# Patient Record
Sex: MALE | Race: OTHER | Employment: OTHER | URBAN - METROPOLITAN AREA
[De-identification: names, ages, dates, MRNs, and addresses within clinical notes are randomized per-mention and may not be internally consistent; named-entity substitution may affect disease eponyms.]

---

## 2021-02-04 ENCOUNTER — OFFICE VISIT (OUTPATIENT)
Dept: ORTHOPEDIC SURGERY | Age: 46
End: 2021-02-04
Payer: COMMERCIAL

## 2021-02-04 VITALS
OXYGEN SATURATION: 95 % | HEART RATE: 71 BPM | RESPIRATION RATE: 15 BRPM | WEIGHT: 170 LBS | HEIGHT: 62 IN | BODY MASS INDEX: 31.28 KG/M2

## 2021-02-04 DIAGNOSIS — Q68.8 OS TRIGONUM SYNDROME: ICD-10-CM

## 2021-02-04 DIAGNOSIS — M25.572 CHRONIC PAIN OF LEFT ANKLE: ICD-10-CM

## 2021-02-04 DIAGNOSIS — M79.672 LEFT FOOT PAIN: Primary | ICD-10-CM

## 2021-02-04 DIAGNOSIS — G89.29 CHRONIC PAIN OF LEFT ANKLE: ICD-10-CM

## 2021-02-04 PROCEDURE — 99203 OFFICE O/P NEW LOW 30 MIN: CPT | Performed by: ORTHOPAEDIC SURGERY

## 2021-02-04 SDOH — HEALTH STABILITY: MENTAL HEALTH: HOW MANY STANDARD DRINKS CONTAINING ALCOHOL DO YOU HAVE ON A TYPICAL DAY?: NOT ASKED

## 2021-02-04 SDOH — HEALTH STABILITY: MENTAL HEALTH: HOW OFTEN DO YOU HAVE A DRINK CONTAINING ALCOHOL?: NOT ASKED

## 2021-02-04 NOTE — PATIENT INSTRUCTIONS
Continue weight-bearing as tolerated. Continue range of motion exercises as instructed. Ice and elevate as needed. Tylenol or Motrin for pain. We will schedule surgery at soonest convenience. If you have any questions regarding your surgery please call our office and ask to speak with Wendy.  [phone]

## 2021-02-04 NOTE — PROGRESS NOTES
Patient persents to the office today for left foot pain. Pt states he has been seeing Dr. Home Mcknight for his left foot. Pt states he has a difficult time bearing weight on his left foot with out pain. Pt states he has had steroid injection in the left foot and has some physical therapy which has not helped with the pain. Pt states he does wear a brace which does help with his pain and keeps his foot stable. Pt states any time he bears weight on the lateral side of his foot his ankle will give out on him. Pt states pain is a 9/10 and will increase the more he is on his feet. Pt states that pain will travel along the lateral aspect of the foot into his left ankle. Pt denies any injury or accident to the left foot.

## 2021-02-10 PROBLEM — Q68.8 OS TRIGONUM SYNDROME: Status: ACTIVE | Noted: 2021-02-10

## 2021-02-10 ASSESSMENT — ENCOUNTER SYMPTOMS
SHORTNESS OF BREATH: 0
CHEST TIGHTNESS: 0
EYE REDNESS: 0
VOMITING: 0
COLOR CHANGE: 0
EYE PAIN: 0
WHEEZING: 0

## 2021-02-10 NOTE — PROGRESS NOTES
2/4/2021   Chief Complaint   Patient presents with    Foot Injury     left foot pain        History of Present Illness:                             Kerri Hurtado is a 41 y.o. male who presents today for evaluation of his left ankle and foot pain. He has been dealing with worsening symptoms over the last year with pain most severe along the posterior lateral ankle worse with walking weightbearing and pushoff activities. He occasionally gets sensation of sharp stabbing pains at the posterior lateral aspect of his ankle and has noticed feelings of weakness or his ankle giving out because of sharp pains. He has been treated appropriately with conservative measures including bracing and steroid injections but his symptoms have continued to persist despite this. Patient persents to the office today for left foot pain. Pt states he has been seeing Dr. Rylee Lombardi for his left foot. Pt states he has a difficult time bearing weight on his left foot with out pain. Pt states he has had steroid injection in the left foot and has some physical therapy which has not helped with the pain. Pt states he does wear a brace which does help with his pain and keeps his foot stable. Pt states any time he bears weight on the lateral side of his foot his ankle will give out on him. Pt states pain is a 9/10 and will increase the more he is on his feet. Pt states that pain will travel along the lateral aspect of the foot into his left ankle. Pt denies any injury or accident to the left foot. Medical History  Patient's medications, allergies, past medical, surgical, social and family histories were reviewed and updated as appropriate. No past medical history on file. No family history on file.   Social History     Socioeconomic History    Marital status:      Spouse name: None    Number of children: None    Years of education: None    Highest education level: None   Occupational History    None   Social Needs  Financial resource strain: None    Food insecurity     Worry: None     Inability: None    Transportation needs     Medical: None     Non-medical: None   Tobacco Use    Smoking status: Never Smoker    Smokeless tobacco: Never Used   Substance and Sexual Activity    Alcohol use: Not Currently    Drug use: Never    Sexual activity: None   Lifestyle    Physical activity     Days per week: None     Minutes per session: None    Stress: None   Relationships    Social connections     Talks on phone: None     Gets together: None     Attends Taoism service: None     Active member of club or organization: None     Attends meetings of clubs or organizations: None     Relationship status: None    Intimate partner violence     Fear of current or ex partner: None     Emotionally abused: None     Physically abused: None     Forced sexual activity: None   Other Topics Concern    None   Social History Narrative    None     No current outpatient medications on file. No current facility-administered medications for this visit. No Known Allergies    Review of Systems:   Review of Systems   Constitutional: Negative for chills and fever. HENT: Negative for congestion and sneezing. Eyes: Negative for pain and redness. Respiratory: Negative for chest tightness, shortness of breath and wheezing. Cardiovascular: Negative for chest pain and palpitations. Gastrointestinal: Negative for vomiting. Musculoskeletal: Positive for arthralgias. Skin: Negative for color change and rash. Neurological: Negative for weakness and numbness. Psychiatric/Behavioral: Negative for agitation. The patient is not nervous/anxious. Examination:  General Exam:  Vitals: Pulse 71   Resp 15   Ht 5' 2\" (1.575 m)   Wt 170 lb (77.1 kg)   SpO2 95%   BMI 31.09 kg/m²    Physical Exam  Vitals signs and nursing note reviewed.    Constitutional: Appearance: Normal appearance. HENT:      Head: Normocephalic and atraumatic. Eyes:      Conjunctiva/sclera: Conjunctivae normal.      Pupils: Pupils are equal, round, and reactive to light. Neck:      Musculoskeletal: Normal range of motion. Pulmonary:      Effort: Pulmonary effort is normal.   Musculoskeletal:      Left knee: He exhibits normal range of motion, no swelling, no effusion and no ecchymosis. No tenderness found. Right ankle: He exhibits normal range of motion, no swelling, no ecchymosis, no deformity, no laceration and normal pulse. No tenderness. Achilles tendon normal.      Comments: Left lower extremity:  There is tenderness to palpation maximally along the posterior ankle joint no significant laterally at the level of the talus. Mild soft tissue swelling at the retrocalcaneal and posterior ankle areas. No tenderness to palpation at the medial or lateral malleoli. No tenderness to palpation at the anterior ankle joint. Active range of motion of the ankle is full but painful at the extremes of both flexion and extension. There is positive impingement sign during forced ankle plantarflexion with pain referred to the posterior lateral ankle. Strength is 5 out of 5 with ankle dorsiflexion and plantarflexion as well as hindfoot inversion and eversion. No instability at the ankle with stress examination. 2+ DP pulse and brisk cap refill present. Skin is intact throughout. Sensation is intact to light touch throughout. Skin:     General: Skin is warm and dry. Neurological:      Mental Status: He is alert and oriented to person, place, and time.    Psychiatric:         Mood and Affect: Mood normal.         Behavior: Behavior normal.            Diagnostic testing:  X-rays reviewed in office, I independently reviewed the films in the office today:   Narrative   3 views of the ankle show evidence of a large os trigonum at the posterior aspect of the ankle joint with mild spurring at the posterior aspect of    the tibiotalar joint adjacent to the os trigonum.  No evidence of    fracture.  No joint space narrowing or degenerative changes at the    subtalar joint.  Normal bone density.             Office Procedures:  No orders of the defined types were placed in this encounter. Assessment and Plan  1. 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 His exam and history are consistent with os trigonum syndrome and posterior ankle impingement which is related to his large accessory bone. We discussed further treatment options with ongoing injections or bracing however patient feels that he has tried these in the past and has not had any relief. Given the severity of his pain and failure of conservative treatments, I recommended we proceed with surgical excision of the os trigonum. I explained to him that the goal of this procedure would be to limit the pain and impingement at the posterior aspect of his ankle from the large accessory bone. I explained that it is likely that the area was stable initially but likely with some type of injury has created some instability of the accessory bone and now it has continued to be symptomatic. We discussed the risks and benefits of surgery. We discussed the rehabilitation course and the need to be protective of the ankle for about 4 to 6 weeks after surgery. We discussed potential risks including infection, wound healing problems, DVT, stiffness, weakness and other medical issues. He understands goals of surgery and pertinent risks. He would like to proceed with excision of the os trigonum. The patient was counseled at length about the risks of lamine Covid-19 during their perioperative period and any recovery window from their procedure. The patient was made aware that lamine Covid-19  may worsen their prognosis for recovering from their procedure  and lend to a higher morbidity and/or mortality risk. All material risks, benefits, and reasonable alternatives including postponing the procedure were discussed. The patient does wish to proceed with the procedure at this time.           Electronically signed by Lux Tadeo MD on 2/10/2021 at 1:04 PM

## 2021-02-23 DIAGNOSIS — Q68.8 OS TRIGONUM SYNDROME: Primary | ICD-10-CM

## 2021-02-26 NOTE — PROGRESS NOTES
2/26/21 - . LM concerning  surgery on 3/3/21  - have your covid-19 test performed within 10  days of your procedure, then quarantine yourself until after your procedure. Please call the PAT Nurse.

## 2021-03-03 ENCOUNTER — HOSPITAL ENCOUNTER (OUTPATIENT)
Age: 46
Setting detail: OUTPATIENT SURGERY
Discharge: HOME | End: 2021-03-03
Attending: ORTHOPAEDIC SURGERY | Admitting: ORTHOPAEDIC SURGERY
Payer: COMMERCIAL

## 2021-03-03 ENCOUNTER — APPOINTMENT (OUTPATIENT)
Dept: GENERAL RADIOLOGY | Age: 46
End: 2021-03-03
Attending: ORTHOPAEDIC SURGERY
Payer: COMMERCIAL

## 2021-03-03 ENCOUNTER — ANESTHESIA (OUTPATIENT)
Dept: OPERATING ROOM | Age: 46
End: 2021-03-03
Payer: COMMERCIAL

## 2021-03-03 ENCOUNTER — ANESTHESIA EVENT (OUTPATIENT)
Dept: OPERATING ROOM | Age: 46
End: 2021-03-03
Payer: COMMERCIAL

## 2021-03-03 VITALS
SYSTOLIC BLOOD PRESSURE: 118 MMHG | TEMPERATURE: 97.6 F | HEART RATE: 80 BPM | WEIGHT: 180 LBS | DIASTOLIC BLOOD PRESSURE: 72 MMHG | HEIGHT: 63 IN | OXYGEN SATURATION: 97 % | BODY MASS INDEX: 31.89 KG/M2 | RESPIRATION RATE: 16 BRPM

## 2021-03-03 VITALS
RESPIRATION RATE: 11 BRPM | OXYGEN SATURATION: 97 % | SYSTOLIC BLOOD PRESSURE: 118 MMHG | TEMPERATURE: 97.3 F | DIASTOLIC BLOOD PRESSURE: 66 MMHG

## 2021-03-03 DIAGNOSIS — Q68.8 OS TRIGONUM SYNDROME: Primary | ICD-10-CM

## 2021-03-03 LAB
SARS-COV-2, NAAT: NOT DETECTED
SOURCE: NORMAL

## 2021-03-03 PROCEDURE — 7100000001 HC PACU RECOVERY - ADDTL 15 MIN: Performed by: ORTHOPAEDIC SURGERY

## 2021-03-03 PROCEDURE — 3600000002 HC SURGERY LEVEL 2 BASE: Performed by: ORTHOPAEDIC SURGERY

## 2021-03-03 PROCEDURE — 6360000002 HC RX W HCPCS: Performed by: ANESTHESIOLOGY

## 2021-03-03 PROCEDURE — 6360000002 HC RX W HCPCS: Performed by: ORTHOPAEDIC SURGERY

## 2021-03-03 PROCEDURE — 7100000011 HC PHASE II RECOVERY - ADDTL 15 MIN: Performed by: ORTHOPAEDIC SURGERY

## 2021-03-03 PROCEDURE — 76000 FLUOROSCOPY <1 HR PHYS/QHP: CPT

## 2021-03-03 PROCEDURE — 2500000003 HC RX 250 WO HCPCS: Performed by: ORTHOPAEDIC SURGERY

## 2021-03-03 PROCEDURE — 7100000010 HC PHASE II RECOVERY - FIRST 15 MIN: Performed by: ORTHOPAEDIC SURGERY

## 2021-03-03 PROCEDURE — 2709999900 HC NON-CHARGEABLE SUPPLY: Performed by: ORTHOPAEDIC SURGERY

## 2021-03-03 PROCEDURE — 28120 PART REMOVAL OF ANKLE/HEEL: CPT | Performed by: ORTHOPAEDIC SURGERY

## 2021-03-03 PROCEDURE — 3700000000 HC ANESTHESIA ATTENDED CARE: Performed by: ORTHOPAEDIC SURGERY

## 2021-03-03 PROCEDURE — 2580000003 HC RX 258: Performed by: ANESTHESIOLOGY

## 2021-03-03 PROCEDURE — 6360000002 HC RX W HCPCS: Performed by: NURSE ANESTHETIST, CERTIFIED REGISTERED

## 2021-03-03 PROCEDURE — 3700000001 HC ADD 15 MINUTES (ANESTHESIA): Performed by: ORTHOPAEDIC SURGERY

## 2021-03-03 PROCEDURE — 2500000003 HC RX 250 WO HCPCS: Performed by: NURSE ANESTHETIST, CERTIFIED REGISTERED

## 2021-03-03 PROCEDURE — 7100000000 HC PACU RECOVERY - FIRST 15 MIN: Performed by: ORTHOPAEDIC SURGERY

## 2021-03-03 PROCEDURE — 87635 SARS-COV-2 COVID-19 AMP PRB: CPT

## 2021-03-03 PROCEDURE — 3600000012 HC SURGERY LEVEL 2 ADDTL 15MIN: Performed by: ORTHOPAEDIC SURGERY

## 2021-03-03 RX ORDER — DEXAMETHASONE SODIUM PHOSPHATE 4 MG/ML
INJECTION, SOLUTION INTRA-ARTICULAR; INTRALESIONAL; INTRAMUSCULAR; INTRAVENOUS; SOFT TISSUE PRN
Status: DISCONTINUED | OUTPATIENT
Start: 2021-03-03 | End: 2021-03-03 | Stop reason: SDUPTHER

## 2021-03-03 RX ORDER — HYDROCODONE BITARTRATE AND ACETAMINOPHEN 5; 325 MG/1; MG/1
1 TABLET ORAL PRN
Status: DISCONTINUED | OUTPATIENT
Start: 2021-03-03 | End: 2021-03-03 | Stop reason: HOSPADM

## 2021-03-03 RX ORDER — PROPOFOL 10 MG/ML
INJECTION, EMULSION INTRAVENOUS PRN
Status: DISCONTINUED | OUTPATIENT
Start: 2021-03-03 | End: 2021-03-03 | Stop reason: SDUPTHER

## 2021-03-03 RX ORDER — DIPHENHYDRAMINE HYDROCHLORIDE 50 MG/ML
12.5 INJECTION INTRAMUSCULAR; INTRAVENOUS
Status: DISCONTINUED | OUTPATIENT
Start: 2021-03-03 | End: 2021-03-03 | Stop reason: HOSPADM

## 2021-03-03 RX ORDER — HYDROMORPHONE HCL 110MG/55ML
0.5 PATIENT CONTROLLED ANALGESIA SYRINGE INTRAVENOUS EVERY 5 MIN PRN
Status: DISCONTINUED | OUTPATIENT
Start: 2021-03-03 | End: 2021-03-03 | Stop reason: HOSPADM

## 2021-03-03 RX ORDER — HYDROCODONE BITARTRATE AND ACETAMINOPHEN 5; 325 MG/1; MG/1
2 TABLET ORAL PRN
Status: DISCONTINUED | OUTPATIENT
Start: 2021-03-03 | End: 2021-03-03 | Stop reason: HOSPADM

## 2021-03-03 RX ORDER — PROMETHAZINE HYDROCHLORIDE 25 MG/ML
6.25 INJECTION, SOLUTION INTRAMUSCULAR; INTRAVENOUS
Status: DISCONTINUED | OUTPATIENT
Start: 2021-03-03 | End: 2021-03-03 | Stop reason: HOSPADM

## 2021-03-03 RX ORDER — HYDROCODONE BITARTRATE AND ACETAMINOPHEN 5; 325 MG/1; MG/1
1 TABLET ORAL EVERY 6 HOURS PRN
Qty: 15 TABLET | Refills: 0 | Status: SHIPPED | OUTPATIENT
Start: 2021-03-03 | End: 2021-03-08

## 2021-03-03 RX ORDER — LABETALOL HYDROCHLORIDE 5 MG/ML
5 INJECTION, SOLUTION INTRAVENOUS EVERY 10 MIN PRN
Status: DISCONTINUED | OUTPATIENT
Start: 2021-03-03 | End: 2021-03-03 | Stop reason: HOSPADM

## 2021-03-03 RX ORDER — FENTANYL CITRATE 50 UG/ML
50 INJECTION, SOLUTION INTRAMUSCULAR; INTRAVENOUS EVERY 5 MIN PRN
Status: DISCONTINUED | OUTPATIENT
Start: 2021-03-03 | End: 2021-03-03 | Stop reason: HOSPADM

## 2021-03-03 RX ORDER — HYDRALAZINE HYDROCHLORIDE 20 MG/ML
5 INJECTION INTRAMUSCULAR; INTRAVENOUS EVERY 10 MIN PRN
Status: DISCONTINUED | OUTPATIENT
Start: 2021-03-03 | End: 2021-03-03 | Stop reason: HOSPADM

## 2021-03-03 RX ORDER — FENTANYL CITRATE 50 UG/ML
INJECTION, SOLUTION INTRAMUSCULAR; INTRAVENOUS PRN
Status: DISCONTINUED | OUTPATIENT
Start: 2021-03-03 | End: 2021-03-03 | Stop reason: SDUPTHER

## 2021-03-03 RX ORDER — MEPERIDINE HYDROCHLORIDE 25 MG/ML
12.5 INJECTION INTRAMUSCULAR; INTRAVENOUS; SUBCUTANEOUS EVERY 5 MIN PRN
Status: DISCONTINUED | OUTPATIENT
Start: 2021-03-03 | End: 2021-03-03 | Stop reason: HOSPADM

## 2021-03-03 RX ORDER — SODIUM CHLORIDE, SODIUM LACTATE, POTASSIUM CHLORIDE, CALCIUM CHLORIDE 600; 310; 30; 20 MG/100ML; MG/100ML; MG/100ML; MG/100ML
INJECTION, SOLUTION INTRAVENOUS CONTINUOUS
Status: DISCONTINUED | OUTPATIENT
Start: 2021-03-03 | End: 2021-03-03 | Stop reason: HOSPADM

## 2021-03-03 RX ORDER — MIDAZOLAM HYDROCHLORIDE 1 MG/ML
INJECTION INTRAMUSCULAR; INTRAVENOUS PRN
Status: DISCONTINUED | OUTPATIENT
Start: 2021-03-03 | End: 2021-03-03 | Stop reason: SDUPTHER

## 2021-03-03 RX ORDER — ONDANSETRON 2 MG/ML
INJECTION INTRAMUSCULAR; INTRAVENOUS PRN
Status: DISCONTINUED | OUTPATIENT
Start: 2021-03-03 | End: 2021-03-03 | Stop reason: SDUPTHER

## 2021-03-03 RX ORDER — ROCURONIUM BROMIDE 10 MG/ML
INJECTION, SOLUTION INTRAVENOUS PRN
Status: DISCONTINUED | OUTPATIENT
Start: 2021-03-03 | End: 2021-03-03 | Stop reason: SDUPTHER

## 2021-03-03 RX ORDER — LIDOCAINE HYDROCHLORIDE 20 MG/ML
INJECTION, SOLUTION INFILTRATION; PERINEURAL PRN
Status: DISCONTINUED | OUTPATIENT
Start: 2021-03-03 | End: 2021-03-03 | Stop reason: SDUPTHER

## 2021-03-03 RX ORDER — BUPIVACAINE HYDROCHLORIDE 5 MG/ML
INJECTION, SOLUTION EPIDURAL; INTRACAUDAL
Status: COMPLETED | OUTPATIENT
Start: 2021-03-03 | End: 2021-03-03

## 2021-03-03 RX ADMIN — ROCURONIUM BROMIDE 50 MG: 10 INJECTION INTRAVENOUS at 09:35

## 2021-03-03 RX ADMIN — CEFAZOLIN SODIUM 2 G: 10 INJECTION, POWDER, FOR SOLUTION INTRAVENOUS at 09:44

## 2021-03-03 RX ADMIN — FENTANYL CITRATE 50 MCG: 50 INJECTION INTRAMUSCULAR; INTRAVENOUS at 09:54

## 2021-03-03 RX ADMIN — PROPOFOL 40 MG: 10 INJECTION, EMULSION INTRAVENOUS at 09:54

## 2021-03-03 RX ADMIN — LIDOCAINE HYDROCHLORIDE 100 MG: 20 INJECTION, SOLUTION INFILTRATION; PERINEURAL at 09:33

## 2021-03-03 RX ADMIN — PROPOFOL 160 MG: 10 INJECTION, EMULSION INTRAVENOUS at 09:33

## 2021-03-03 RX ADMIN — MIDAZOLAM 2 MG: 1 INJECTION INTRAMUSCULAR; INTRAVENOUS at 09:27

## 2021-03-03 RX ADMIN — ONDANSETRON 4 MG: 2 INJECTION INTRAMUSCULAR; INTRAVENOUS at 09:46

## 2021-03-03 RX ADMIN — DEXAMETHASONE SODIUM PHOSPHATE 8 MG: 4 INJECTION, SOLUTION INTRAMUSCULAR; INTRAVENOUS at 09:46

## 2021-03-03 RX ADMIN — SUGAMMADEX 50 MG: 100 INJECTION, SOLUTION INTRAVENOUS at 10:10

## 2021-03-03 RX ADMIN — SUGAMMADEX 50 MG: 100 INJECTION, SOLUTION INTRAVENOUS at 10:06

## 2021-03-03 RX ADMIN — SUGAMMADEX 50 MG: 100 INJECTION, SOLUTION INTRAVENOUS at 10:07

## 2021-03-03 RX ADMIN — SODIUM CHLORIDE, POTASSIUM CHLORIDE, SODIUM LACTATE AND CALCIUM CHLORIDE: 600; 310; 30; 20 INJECTION, SOLUTION INTRAVENOUS at 08:04

## 2021-03-03 RX ADMIN — FENTANYL CITRATE 50 MCG: 50 INJECTION, SOLUTION INTRAMUSCULAR; INTRAVENOUS at 10:40

## 2021-03-03 RX ADMIN — SUGAMMADEX 50 MG: 100 INJECTION, SOLUTION INTRAVENOUS at 10:11

## 2021-03-03 RX ADMIN — FENTANYL CITRATE 50 MCG: 50 INJECTION INTRAMUSCULAR; INTRAVENOUS at 09:32

## 2021-03-03 ASSESSMENT — PAIN SCALES - GENERAL
PAINLEVEL_OUTOF10: 5
PAINLEVEL_OUTOF10: 7
PAINLEVEL_OUTOF10: 4
PAINLEVEL_OUTOF10: 7
PAINLEVEL_OUTOF10: 5

## 2021-03-03 ASSESSMENT — PAIN DESCRIPTION - DESCRIPTORS
DESCRIPTORS: ACHING
DESCRIPTORS: SHARP

## 2021-03-03 ASSESSMENT — PAIN DESCRIPTION - ORIENTATION
ORIENTATION: LEFT

## 2021-03-03 ASSESSMENT — PULMONARY FUNCTION TESTS
PIF_VALUE: 24
PIF_VALUE: 1
PIF_VALUE: 0
PIF_VALUE: 23
PIF_VALUE: 24
PIF_VALUE: 18
PIF_VALUE: 23
PIF_VALUE: 23
PIF_VALUE: 24
PIF_VALUE: 1
PIF_VALUE: 24
PIF_VALUE: 23
PIF_VALUE: 19
PIF_VALUE: 23
PIF_VALUE: 15
PIF_VALUE: 23
PIF_VALUE: 1
PIF_VALUE: 0
PIF_VALUE: 23
PIF_VALUE: 23
PIF_VALUE: 24
PIF_VALUE: 23
PIF_VALUE: 23
PIF_VALUE: 12
PIF_VALUE: 24
PIF_VALUE: 23
PIF_VALUE: 24
PIF_VALUE: 23
PIF_VALUE: 0

## 2021-03-03 ASSESSMENT — PAIN DESCRIPTION - LOCATION
LOCATION: ANKLE

## 2021-03-03 ASSESSMENT — PAIN DESCRIPTION - ONSET: ONSET: ON-GOING

## 2021-03-03 ASSESSMENT — PAIN DESCRIPTION - PROGRESSION: CLINICAL_PROGRESSION: GRADUALLY IMPROVING

## 2021-03-03 ASSESSMENT — PAIN DESCRIPTION - FREQUENCY
FREQUENCY: CONTINUOUS

## 2021-03-03 ASSESSMENT — PAIN DESCRIPTION - PAIN TYPE: TYPE: SURGICAL PAIN

## 2021-03-03 NOTE — FLOWSHEET NOTE
Returned to room 13. Report received from [facility]. ALert and oriented. Respirations even and unlabored. Color pink. Left ankle in boot. Dressing intact. Toes on right foot are pink/ warm and able to be wiggled. Beverage provided. Call light in reach.

## 2021-03-03 NOTE — ANESTHESIA PRE PROCEDURE
Department of Anesthesiology  Preprocedure Note       Name:  Brenda Ga   Age:  41 y.o.  :  1975                                          MRN:  [id number]         Date:  3/3/2021      Surgeon: Kulwant Ochoa):  Fam Samaniego MD    Procedure: Procedure(s):  LEFT ANKLE OS TRIGONUM EXCISION    Medications prior to admission:   Prior to Admission medications    Not on File       Current medications:    Current Facility-Administered Medications   Medication Dose Route Frequency Provider Last Rate Last Admin    lactated ringers infusion   Intravenous Continuous Estanislado Pies, DO        meperidine (DEMEROL) injection 12.5 mg  12.5 mg Intravenous Q5 Min PRN Estanislado Pies, DO        HYDROmorphone (DILAUDID) injection 0.5 mg  0.5 mg Intravenous Q5 Min PRN Estanislado Pies, DO        fentaNYL (SUBLIMAZE) injection 50 mcg  50 mcg Intravenous Q5 Min PRN Estanislado Pies, DO        HYDROcodone-acetaminophen (NORCO) 5-325 MG per tablet 1 tablet  1 tablet Oral PRN Estanislado Pies, DO        Or    HYDROcodone-acetaminophen (NORCO) 5-325 MG per tablet 2 tablet  2 tablet Oral PRN Estanislado Pies, DO        promethazine (PHENERGAN) injection 6.25 mg  6.25 mg Intravenous Once PRN Estanislado Pies, DO        diphenhydrAMINE (BENADRYL) injection 12.5 mg  12.5 mg Intravenous Once PRN Estanislado Pies, DO        labetalol (NORMODYNE;TRANDATE) injection 5 mg  5 mg Intravenous Q10 Min PRN Estanislado Pies, DO        hydrALAZINE (APRESOLINE) injection 5 mg  5 mg Intravenous Q10 Min PRN Estanislado Pies, DO           Allergies:  No Known Allergies    Problem List:    Patient Active Problem List   Diagnosis Code    Os trigonum syndrome Q68.8       Past Medical History:  No past medical history on file. Past Surgical History:  No past surgical history on file.     Social History:    Social History     Tobacco Use    Smoking status: Never Smoker    Smokeless tobacco: Never Used   Substance Use Topics  Alcohol use: Not Currently                                Counseling given: Not Answered      Vital Signs (Current):   Vitals:    03/03/21 0726   BP: 115/65   Pulse: 68   Resp: 17   Temp: 99.3 °F (37.4 °C)   TempSrc: Temporal   SpO2: 97%   Weight: 180 lb (81.6 kg)   Height: 5' 3\" (1.6 m)                                              BP Readings from Last 3 Encounters:   03/03/21 115/65       NPO Status:                                                                                 BMI:   Wt Readings from Last 3 Encounters:   03/03/21 180 lb (81.6 kg)   02/04/21 170 lb (77.1 kg)     Body mass index is 31.89 kg/m². CBC: No results found for: WBC, RBC, HGB, HCT, MCV, RDW, PLT    CMP: No results found for: NA, K, CL, CO2, BUN, CREATININE, GFRAA, AGRATIO, LABGLOM, GLUCOSE, PROT, CALCIUM, BILITOT, ALKPHOS, AST, ALT    POC Tests: No results for input(s): POCGLU, POCNA, POCK, POCCL, POCBUN, POCHEMO, POCHCT in the last 72 hours.     Coags: No results found for: PROTIME, INR, APTT    HCG (If Applicable): No results found for: PREGTESTUR, PREGSERUM, HCG, HCGQUANT     ABGs: No results found for: PHART, PO2ART, GGN8AAK, GDA0LTA, BEART, X5LSGBTC     Type & Screen (If Applicable):  No results found for: LABABO, LABRH    Drug/Infectious Status (If Applicable):  No results found for: HIV, HEPCAB    COVID-19 Screening (If Applicable): No results found for: COVID19      Anesthesia Evaluation  Patient summary reviewed no history of anesthetic complications:   Airway: Mallampati: II  TM distance: >3 FB   Neck ROM: full  Mouth opening: > = 3 FB Dental: normal exam         Pulmonary:Negative Pulmonary ROS breath sounds clear to auscultation                             Cardiovascular:Negative CV ROS  Exercise tolerance: good (>4 METS),           Rhythm: regular  Rate: normal           Beta Blocker:  Not on Beta Blocker         Neuro/Psych:   Negative Neuro/Psych ROS              GI/Hepatic/Renal: Neg GI/Hepatic/Renal ROS Endo/Other: Negative Endo/Other ROS                    Abdominal:           Vascular: negative vascular ROS. Anesthesia Plan      general     ASA 1       Induction: intravenous. MIPS: Postoperative opioids intended and Prophylactic antiemetics administered. Anesthetic plan and risks discussed with patient. Plan discussed with CRNA.                   Teetee Guzman DO   3/3/2021

## 2021-03-03 NOTE — OP NOTE
Operative Note      Patient: Leif Lopez  YOB: 1975  MRN: [id number]    Date of Procedure: 3/3/2021    Pre-Op Diagnosis: LEFT ANKLE OS TRIGONUM SYNDROME, POSTERIOR ANKLE IMPINGEMENT, LEFT    Post-Op Diagnosis: Same       Procedure(s):  LEFT ANKLE OS TRIGONUM EXCISION    Surgeon(s):  Navid Berkowitz MD    Assistant:   * No surgical staff found *    Anesthesia: General    Estimated Blood Loss (mL): Minimal    Complications: None    Specimens:   * No specimens in log *    Implants:  * No implants in log *      Drains: * No LDAs found *    Findings:     Detailed Description of Procedure: The patient was identified in the preoperative area and the site was marked. He was taken back to the operating room and after induction of general endotracheal anesthesia, he was placed prone on the operating table. A thigh tourniquet was placed. His left lower extremity was prepped and draped in sterile fashion. Timeout was performed and preoperative antibiotics were given. The tourniquet was inflated to 300 mmHg and deflated at the conclusion of the case after less than 30 minutes of tourniquet time. An incision was made over the posterior lateral aspect of the ankle. Dissection was carried down to subcutaneous tissues and bleeding was controlled with the Bovie. Care was taken to retract and protect branches of the sural nerve. The interval between the Achilles tendon and peroneal tendons were developed and the fascia was incised at the posterior aspect of the ankle. There was evidence of a prominent large os trigonum present at the posterior aspect of the ankle that was impinging during forced plantarflexion of the ankle. X-rays were taken confirming the position and presence of the os trigonum. 15 blade and Success were used to release soft tissue attachments from the os trigonum. The os trigonum was excised with a rongeur and totality.  It was passed off to the back table and measured 20 mm in length, 15 mm in width, and 10 mm in depth. Following removal of the os trigonum there was good passive range of motion ankle no further impingement of the posterior aspect of the ankle joint during plantarflexion. Deep fascial layer was closed over the posterior ankle joint with interrupted 2-0 Vicryl. Deep subcutaneous layers were closed with buried 2-0 Vicryl and skin was closed with erupted 3-0 nylon. Tourniquet was deflated and bleeding was well controlled. The surgical site was injected with 10 mL of half percent plain Marcaine for postoperative pain relief. A sterile dressing was applied with Xeroform, 4 x 8's, sterile web roll, and an Ace wrap. He was then placed to a walking boot. He was extubated and taken to PACU in stable condition. All sponge and needle counts were correct. Postoperative plan:  Patient be allowed to perform weightbearing activities as tolerated in the boot. He can remove the boot at rest for range of motion activities and comfort as needed.     Follow-up in the office in 2 weeks for suture removal.    Electronically signed by Navid Berkowitz MD on 3/3/2021 at 10:28 AM

## 2021-03-03 NOTE — H&P
Chief Complaint   Patient presents with    Foot Injury       left foot pain         History of Present Illness:                             Hedy Garza is a 41 y.o. male who presents today for evaluation of his left ankle and foot pain. He has been dealing with worsening symptoms over the last year with pain most severe along the posterior lateral ankle worse with walking weightbearing and pushoff activities. He occasionally gets sensation of sharp stabbing pains at the posterior lateral aspect of his ankle and has noticed feelings of weakness or his ankle giving out because of sharp pains. He has been treated appropriately with conservative measures including bracing and steroid injections but his symptoms have continued to persist despite this.        Patient persents to the office today for left foot pain. Pt states he has been seeing Dr. Home Mcknight for his left foot. Pt states he has a difficult time bearing weight on his left foot with out pain. Pt states he has had steroid injection in the left foot and has some physical therapy which has not helped with the pain. Pt states he does wear a brace which does help with his pain and keeps his foot stable. Pt states any time he bears weight on the lateral side of his foot his ankle will give out on him. Pt states pain is a 9/10 and will increase the more he is on his feet. Pt states that pain will travel along the lateral aspect of the foot into his left ankle. Pt denies any injury or accident to the left foot.         Medical History  Patient's medications, allergies, past medical, surgical, social and family histories were reviewed and updated as appropriate.     Past Medical History   No past medical history on file. Family History   No family history on file.      Social History   Social History            Socioeconomic History    Marital status:        Spouse name: None    Number of children: None    Years of education: None    Highest education level: None   Occupational History    None   Social Needs    Financial resource strain: None    Food insecurity       Worry: None       Inability: None    Transportation needs       Medical: None       Non-medical: None   Tobacco Use    Smoking status: Never Smoker    Smokeless tobacco: Never Used   Substance and Sexual Activity    Alcohol use: Not Currently    Drug use: Never    Sexual activity: None   Lifestyle    Physical activity       Days per week: None       Minutes per session: None    Stress: None   Relationships    Social connections       Talks on phone: None       Gets together: None       Attends [employer] service: None       Active member of club or organization: None       Attends meetings of clubs or organizations: None       Relationship status: None    Intimate partner violence       Fear of current or ex partner: None       Emotionally abused: None       Physically abused: None       Forced sexual activity: None   Other Topics Concern    None   Social History Narrative    None         Current Facility-Administered Medications   No current outpatient medications on file.      No current facility-administered medications for this visit.          No Known Allergies     Review of Systems:   Review of Systems   Constitutional: Negative for chills and fever. HENT: Negative for congestion and sneezing. Eyes: Negative for pain and redness. Respiratory: Negative for chest tightness, shortness of breath and wheezing. Cardiovascular: Negative for chest pain and palpitations. Gastrointestinal: Negative for vomiting. Musculoskeletal: Positive for arthralgias. Skin: Negative for color change and rash. Neurological: Negative for weakness and numbness. Psychiatric/Behavioral: Negative for agitation.  The patient is not nervous/anxious.                                                  Examination:  General Exam:  Vitals: Pulse 71   Resp 15   Ht 5' 2\" (1.575 m)   Wt 170 lb (77.1 kg)   SpO2 95%   BMI 31.09 kg/m²    Physical Exam  Vitals signs and nursing note reviewed. Constitutional:       Appearance: Normal appearance. HENT:      Head: Normocephalic and atraumatic. Eyes:      Conjunctiva/sclera: Conjunctivae normal.      Pupils: Pupils are equal, round, and reactive to light. Neck:      Musculoskeletal: Normal range of motion. Pulmonary:      Effort: Pulmonary effort is normal.   Musculoskeletal:      Left knee: He exhibits normal range of motion, no swelling, no effusion and no ecchymosis. No tenderness found. Right ankle: He exhibits normal range of motion, no swelling, no ecchymosis, no deformity, no laceration and normal pulse. No tenderness. Achilles tendon normal.      Comments: Left lower extremity:  There is tenderness to palpation maximally along the posterior ankle joint no significant laterally at the level of the talus. Mild soft tissue swelling at the retrocalcaneal and posterior ankle areas. No tenderness to palpation at the medial or lateral malleoli. No tenderness to palpation at the anterior ankle joint. Active range of motion of the ankle is full but painful at the extremes of both flexion and extension. There is positive impingement sign during forced ankle plantarflexion with pain referred to the posterior lateral ankle. Strength is 5 out of 5 with ankle dorsiflexion and plantarflexion as well as hindfoot inversion and eversion. No instability at the ankle with stress examination. 2+ DP pulse and brisk cap refill present. Skin is intact throughout. Sensation is intact to light touch throughout. Skin:     General: Skin is warm and dry. Neurological:      Mental Status: He is alert and oriented to person, place, and time.    Psychiatric:         Mood and Affect: Mood normal.         Behavior: Behavior normal.               Diagnostic testing:  X-rays reviewed in office, I independently reviewed the films in the office today:   Narrative   3 views of the ankle show evidence of a large os trigonum at the posterior    aspect of the ankle joint with mild spurring at the posterior aspect of    the tibiotalar joint adjacent to the os trigonum.  No evidence of    fracture.  No joint space narrowing or degenerative changes at the    subtalar joint.  Normal bone density.                Office Procedures:  No orders of the defined types were placed in this encounter.        Assessment and Plan  1. 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 His exam and history are consistent with os trigonum syndrome and posterior ankle impingement which is related to his large accessory bone. We discussed further treatment options with ongoing injections or bracing however patient feels that he has tried these in the past and has not had any relief.     Given the severity of his pain and failure of conservative treatments, I recommended we proceed with surgical excision of the os trigonum. I explained to him that the goal of this procedure would be to limit the pain and impingement at the posterior aspect of his ankle from the large accessory bone. I explained that it is likely that the area was stable initially but likely with some type of injury has created some instability of the accessory bone and now it has continued to be symptomatic.     We discussed the risks and benefits of surgery. We discussed the rehabilitation course and the need to be protective of the ankle for about 4 to 6 weeks after surgery. We discussed potential risks including infection, wound healing problems, DVT, stiffness, weakness and other medical issues.     He understands goals of surgery and pertinent risks.   He would like to proceed with excision of the os trigonum.     The patient was counseled at length about the risks of lamine Covid-19 during their perioperative period and any recovery window from their procedure.  The patient was made aware that lamine Covid-19  may worsen their prognosis for recovering from their procedure  and lend to a higher morbidity and/or mortality risk.  All material risks, benefits, and reasonable alternatives including postponing the procedure were discussed. The patient does wish to proceed with the procedure at this time. History and Physical exam note from the office visit is copied above from epic. I have reviewed the note and examined the patient and find no relevant changes.      I have reviewed with the patient and/or family the risks, benefits, and alternatives to the procedure as well as expected postoperative course    Stacey Samaniego MD

## 2021-03-16 ENCOUNTER — OFFICE VISIT (OUTPATIENT)
Dept: ORTHOPEDIC SURGERY | Age: 46
End: 2021-03-16

## 2021-03-16 VITALS
HEART RATE: 69 BPM | HEIGHT: 63 IN | OXYGEN SATURATION: 99 % | RESPIRATION RATE: 15 BRPM | WEIGHT: 180 LBS | BODY MASS INDEX: 31.89 KG/M2

## 2021-03-16 DIAGNOSIS — Z09 S/P ORTHOPEDIC SURGERY, FOLLOW-UP EXAM: ICD-10-CM

## 2021-03-16 DIAGNOSIS — Z09 POSTOP CHECK: Primary | ICD-10-CM

## 2021-03-16 PROCEDURE — 99024 POSTOP FOLLOW-UP VISIT: CPT | Performed by: ORTHOPAEDIC SURGERY

## 2021-03-16 NOTE — PROGRESS NOTES
Patient returns to the office today for s/p left ankle OS trigonum excison DOS 3/3/21 2 weeks. Pt states pain today is a 0/10. Pt states his ankle does feel weak when he is walking in his walking boot but has no pain. Pt states he is no longer taking the pain medication. Pt states the surgery did helped with his pain in the left ankle.

## 2021-03-16 NOTE — PATIENT INSTRUCTIONS
Continue weight-bearing as tolerated in brace  Continue range of motion exercises as instructed. Ice and elevate as needed. Tylenol or Motrin for pain.   Stiches removed today  Keep incision dry and clean at all times  Follow up in 4 weeks   Fitted for ankle brace today

## 2021-03-17 NOTE — PROGRESS NOTES
3/16/2021   Chief Complaint   Patient presents with    Post-Op Check     s/p left ankle OS trigonum excison DOS 3/3/21        History of Present Illness:                             Itzel Donis is a 41 y.o. male who returns today for follow-up of his left ankle surgery. He has done well in his boot. He has no pain today. He feels that his ankle is healing well. Patient returns to the office today for s/p left ankle OS trigonum excison DOS 3/3/21 2 weeks. Pt states pain today is a 0/10. Pt states his ankle does feel weak when he is walking in his walking boot but has no pain. Pt states he is no longer taking the pain medication. Pt states the surgery did helped with his pain in the left ankle. Medical History  Patient's medications, allergies, past medical, surgical, social and family histories were reviewed and updated as appropriate. Review of Systems:   Review of Systems                                            Examination:  General Exam:  Vitals: Pulse 69   Resp 15   Ht 5' 3\" (1.6 m)   Wt 180 lb (81.6 kg)   SpO2 99%   BMI 31.89 kg/m²    Physical Exam   Left lower extremity:  Well-healed surgical scar over the posterior lateral aspect of the ankle. Sutures were removed. No erythema, drainage, or induration. Mild expected tenderness and minimal swelling present at the surgical site. There is smooth painless active range of motion with ankle dorsiflexion plantarflexion which is limited due to recent immobilization. Sensation and motor functions intact throughout the foot. Assessment and Plan  1. Left ankle excision os trigonum    I reviewed the findings of surgery with the patient explained that there is a large accessory bone that was removed in its entirety. I have recommended that we gradually advance his activities and begin stretching and weightbearing and gentle strengthening. I have provided him with an ankle brace that he can wean out of the boot as pain allows.   I

## 2021-04-13 ENCOUNTER — OFFICE VISIT (OUTPATIENT)
Dept: ORTHOPEDIC SURGERY | Age: 46
End: 2021-04-13

## 2021-04-13 VITALS
RESPIRATION RATE: 16 BRPM | BODY MASS INDEX: 31.89 KG/M2 | WEIGHT: 180 LBS | HEART RATE: 78 BPM | OXYGEN SATURATION: 98 % | HEIGHT: 63 IN

## 2021-04-13 DIAGNOSIS — Z09 S/P ORTHOPEDIC SURGERY, FOLLOW-UP EXAM: Primary | ICD-10-CM

## 2021-04-13 DIAGNOSIS — Z09 POSTOP CHECK: ICD-10-CM

## 2021-04-13 PROCEDURE — 99024 POSTOP FOLLOW-UP VISIT: CPT | Performed by: ORTHOPAEDIC SURGERY

## 2021-04-13 NOTE — PATIENT INSTRUCTIONS
Weightbearing and activities as tolerated  Work on ROM and strengthening exercises of the ankle   Remain in ankle brace for stability and support of the ankle  Rest, ice, and elevate as needed  May take Ibuprofen or Motrin as needed  Follow up in one month  Patient Education        Ankle: Exercises  Introduction  Here are some examples of exercises for you to try. The exercises may be suggested for a condition or for rehabilitation. Start each exercise slowly. Ease off the exercises if you start to have pain. You will be told when to start these exercises and which ones will work best for you. How to do the exercises  'Alphabet' exercise   1. Trace the alphabet with your toe. This helps your ankle move in all directions. Side-to-side knee swing exercise   1. Sit in a chair with your foot flat on the floor. 2. Slowly move your knee from side to side while keeping your foot pressed flat. 3. Continue this exercise for 2 to 3 minutes. Towel curl   1. While sitting, place your foot on a towel on the floor and scrunch the towel toward you with your toes. 2. Then use your toes to push the towel away from you. 3. Make this exercise more challenging by placing a weighted object, such as a soup can, on the other end of the towel. Towel stretch   1. Sit with your legs extended and knees straight. 2. Place a towel around your foot just under the toes. 3. Hold each end of the towel in each hand, with your hands above your knees. 4. Pull back with the towel so that your foot stretches toward you. 5. Hold the position for at least 15 to 30 seconds. 6. Repeat 2 to 4 times a session, up to 5 sessions a day. Ankle eversion exercise   1. Start by sitting with your foot flat on the floor and pushing it outward against an immovable object such as the wall or heavy furniture. Hold for about 6 seconds, then relax. Repeat 8 to 12 times.   2. After you feel comfortable with this, try using rubber tubing looped around the outside of your feet for resistance. Push your foot out to the side against the tubing, and then count to 10 as you slowly bring your foot back to the middle. Repeat 8 to 12 times. Isometric opposition exercises   1. While sitting, put your feet together flat on the floor. 2. Press your injured foot inward against your other foot. Hold for about 6 seconds, and relax. Repeat 8 to 12 times. 3. Then place the heel of your other foot on top of the injured one. Push down with the top heel while trying to push up with your injured foot. Hold for about 6 seconds, and relax. Repeat 8 to 12 times. Follow-up care is a key part of your treatment and safety. Be sure to make and go to all appointments, and call your doctor if you are having problems. It's also a good idea to know your test results and keep a list of the medicines you take. Where can you learn more? Go to [email or web address] and sign in to your [employer] account. Enter K679 in the [employer] box to learn more about \"Ankle: Exercises. \"     If you do not have an account, please click on the \"Sign Up Now\" link. Current as of: November 16, 2020               Content Version: 12.8  © 2006-2021 [employer]. Care instructions adapted under license by [facility] [place] ([facility]). If you have questions about a medical condition or this instruction, always ask your healthcare professional. [place] [address] any warranty or liability for your use of this information.

## 2021-04-18 NOTE — PROGRESS NOTES
4/13/2021   Chief Complaint   Patient presents with    Post-Op Check     left ankle OS trigonum excision DOS 3/3/21        History of Present Illness:                             Omar Walls is a 40 y.o. male returns today for follow-up of his left ankle excision of os trigonum. He has been able to do well with his walking activities and has been using his brace for support. He denies any major problems but continues to deal with feelings of stiffness and sensitivity at the surgical site    Patient returns to the office for a 6 weeks post operatively following a his left ankle OS trigonum excision that was performed on 3/3/21. Pain is rated at a 8/10 with continued burning sensation along the posterior aspect of the left ankle and patient remaining hesitant to bear full weight onto the left ankle. He is conservatively treating his symptoms by the use of Epsom salt and rest without the use of OTC pain relievers. No new injury reported. Medical History  Patient's medications, allergies, past medical, surgical, social and family histories were reviewed and updated as appropriate. Review of Systems:   Review of Systems                                            Examination:  General Exam:  Vitals: Pulse 78   Resp 16   Ht 5' 3\" (1.6 m)   Wt 180 lb (81.6 kg)   SpO2 98%   BMI 31.89 kg/m²    Physical Exam     Left lower extremity:  Well-healed surgical scar of the posterior lateral aspect of the ankle. Mild sensitivity and tenderness with mild swelling at the posterior aspect of the ankle. Good active range of motion at the ankle joint during dorsiflexion plantarflexion with mild restrictions in full range of motion due to the discomfort at the ankle. Strength is 5/5 with flexion and extension of the ankle. Sensation motor functions intact throughout. Office Procedures:  No orders of the defined types were placed in this encounter. Assessment and Plan  1.   Left ankle excision os trigonum Reassured the patient that he appears to be healing very well following his surgery. I recommended we continue to advance his rehabilitation activities. I showed him stretching and scar tissue massage as well as ways to improve strength and balance of the ankle as a home program.  He will gradually wean out of the brace as tolerated but continue use it for more vigorous activities especially at work.     Follow-up in 6 weeks for check of his progress        Electronically signed by Kya Cade MD on 4/18/2021 at 9:20 AM

## (undated) DEVICE — DRESSING,GAUZE,XEROFORM,CURAD,1"X8",ST: Brand: CURAD

## (undated) DEVICE — TOWEL,OR,DSP,ST,BLUE,STD,6/PK,12PK/CS: Brand: MEDLINE

## (undated) DEVICE — SYRINGE IRRIG 60ML SFT PLIABLE BLB EZ TO GRP 1 HND USE W/

## (undated) DEVICE — PAD,ABDOMINAL,5"X9",ST,LF,25/BX: Brand: MEDLINE INDUSTRIES, INC.

## (undated) DEVICE — ELECTRODE ES AD CRDLSS PT RET REM POLYHESIVE

## (undated) DEVICE — C-ARMOR C-ARM EQUIPMENT COVERS CLEAR STERILE UNIVERSAL FIT 12 PER CASE: Brand: C-ARMOR

## (undated) DEVICE — PADDING,UNDERCAST,COTTON, 3X4YD STERILE: Brand: MEDLINE

## (undated) DEVICE — BANDAGE,SELF ADHRNT,COFLEX,4"X5YD,STRL: Brand: COLABEL

## (undated) DEVICE — BANDAGE,ELASTIC,ESMARK,STERILE,4"X9',LF: Brand: MEDLINE

## (undated) DEVICE — SPONGE LAP W18XL18IN WHT COT 4 PLY FLD STRUNG RADPQ DISP ST

## (undated) DEVICE — PENCIL ES CRD L10FT HND SWCHING ROCK SWCH W/ EDGE COAT BLDE

## (undated) DEVICE — INTENDED FOR TISSUE SEPARATION, AND OTHER PROCEDURES THAT REQUIRE A SHARP SURGICAL BLADE TO PUNCTURE OR CUT.: Brand: BARD-PARKER ® STAINLESS STEEL BLADES

## (undated) DEVICE — DRAPE,U/ SHT,SPLIT,PLAS,STERIL: Brand: MEDLINE

## (undated) DEVICE — BANDAGE COMPR W2INXL5YD WHT BGE POLY COT M E WRP WV HK AND

## (undated) DEVICE — SUTURE MCRYL SZ 2-0 L27IN ABSRB UD SH L26MM TAPERPOINT NDL Y417H

## (undated) DEVICE — CONTAINER,SPECIMEN,OR STERILE,4OZ: Brand: MEDLINE

## (undated) DEVICE — DRAPE SHEET ULTRAGARD: Brand: MEDLINE

## (undated) DEVICE — SUTURE ETHLN SZ 3-0 L30IN NONABSORBABLE BLK FS-1 L24MM 3/8 669H

## (undated) DEVICE — CORD,CAUTERY,BIPOLAR,STERILE: Brand: MEDLINE

## (undated) DEVICE — MARKER SURG SKIN UTIL REGULAR/FINE 2 TIP W/ RUL AND 9 LBL

## (undated) DEVICE — 3M™ STERI-DRAPE™ U-DRAPE 1015: Brand: STERI-DRAPE™

## (undated) DEVICE — PADDING,UNDERCAST,COTTON, 4"X4YD STERILE: Brand: MEDLINE

## (undated) DEVICE — SPONGE GZ W4XL8IN COT WVN 12 PLY

## (undated) DEVICE — SOLUTION IV IRRIG POUR BRL 0.9% SODIUM CHL 2F7124

## (undated) DEVICE — YANKAUER,FLEXIBLE HANDLE,REGLR CAPACITY: Brand: MEDLINE INDUSTRIES, INC.

## (undated) DEVICE — STERILE LATEX POWDER-FREE SURGICAL GLOVESWITH NITRILE COATING: Brand: PROTEXIS

## (undated) DEVICE — TUBING, SUCTION, 9/32" X 10', STRAIGHT: Brand: MEDLINE

## (undated) DEVICE — TUBING PMP L16FT MAIN DISP FOR AR-6400 AR-6475

## (undated) DEVICE — COVER,C-ARM,41X74: Brand: MEDLINE